# Patient Record
Sex: FEMALE | Race: WHITE | Employment: FULL TIME | URBAN - METROPOLITAN AREA
[De-identification: names, ages, dates, MRNs, and addresses within clinical notes are randomized per-mention and may not be internally consistent; named-entity substitution may affect disease eponyms.]

---

## 2017-01-02 ENCOUNTER — TELEPHONE (OUTPATIENT)
Dept: FAMILY MEDICINE CLINIC | Facility: CLINIC | Age: 47
End: 2017-01-02

## 2017-01-02 RX ORDER — LEVOFLOXACIN 500 MG/1
500 TABLET, FILM COATED ORAL DAILY
Qty: 7 TABLET | Refills: 0 | Status: SHIPPED | OUTPATIENT
Start: 2017-01-02 | End: 2017-05-15

## 2017-01-02 NOTE — TELEPHONE ENCOUNTER
Called patient and spoke with her. Patient states that she was seen 12/26/16 for sinus infection. Patient was given and completed her Zpak. Patient states that she still has sinus congestion.   She states that she has been using Flonase and Sudafed with

## 2017-01-09 ENCOUNTER — TELEPHONE (OUTPATIENT)
Dept: FAMILY MEDICINE CLINIC | Facility: CLINIC | Age: 47
End: 2017-01-09

## 2017-01-09 NOTE — TELEPHONE ENCOUNTER
Pt notified of below orders, she states Dr. Rodríguez Portal first available appt is now not until May, & she will just wait until May to see him then. She also states she has completed Zpak & Levaquin Rx but still has a \"stuffy nose\".   No fever, no colored km

## 2017-01-09 NOTE — TELEPHONE ENCOUNTER
Pt notified of below orders. Pt states she has tried the netti pot & it has not helped. She would like to see ENT. Office info given. All questions answered, pt expresses understanding.

## 2017-01-09 NOTE — TELEPHONE ENCOUNTER
Sinus rinse BID, OTC if not better,     ENT group:they come to Dr. Smart [facility] clinic twice a week. Dr. Taya Guan    [address] [facility] [address]  [place], [address] [facility]  [phone].

## 2017-01-09 NOTE — TELEPHONE ENCOUNTER
Please read above message. Pt has already seen Dr. Jeyson Villa 7/26/16. Please advise if Brittney Corona for pt to wait until march to see Dr. Keren Marroquin, or if you have another Rheumotologist you would like her to see. Thank you.

## 2017-01-26 ENCOUNTER — MED REC SCAN ONLY (OUTPATIENT)
Dept: FAMILY MEDICINE CLINIC | Facility: CLINIC | Age: 47
End: 2017-01-26

## 2017-02-08 ENCOUNTER — HOSPITAL ENCOUNTER (OUTPATIENT)
Dept: CT IMAGING | Age: 47
Discharge: HOME | End: 2017-02-08
Attending: OTOLARYNGOLOGY
Payer: COMMERCIAL

## 2017-02-08 DIAGNOSIS — J32.8 OTHER CHRONIC SINUSITIS: ICD-10-CM

## 2017-02-08 PROCEDURE — 70486 CT MAXILLOFACIAL W/O DYE: CPT

## 2017-02-15 RX ORDER — FLUTICASONE PROPIONATE 50 MCG
SPRAY, SUSPENSION (ML) NASAL
Qty: 1 INHALER | Refills: 2 | Status: SHIPPED | OUTPATIENT
Start: 2017-02-15 | End: 2017-05-15

## 2017-05-15 ENCOUNTER — OFFICE VISIT (OUTPATIENT)
Dept: RHEUMATOLOGY | Facility: CLINIC | Age: 47
End: 2017-05-15

## 2017-05-15 VITALS
DIASTOLIC BLOOD PRESSURE: 72 MMHG | HEART RATE: 68 BPM | WEIGHT: 112.63 LBS | RESPIRATION RATE: 16 BRPM | BODY MASS INDEX: 21 KG/M2 | SYSTOLIC BLOOD PRESSURE: 108 MMHG

## 2017-05-15 DIAGNOSIS — L53.9 ERYTHEMA: ICD-10-CM

## 2017-05-15 DIAGNOSIS — M79.641 PAIN IN BOTH HANDS: Primary | ICD-10-CM

## 2017-05-15 DIAGNOSIS — M79.642 PAIN IN BOTH HANDS: Primary | ICD-10-CM

## 2017-05-15 DIAGNOSIS — L40.9 PSORIASIS: ICD-10-CM

## 2017-05-15 PROCEDURE — 99244 OFF/OP CNSLTJ NEW/EST MOD 40: CPT | Performed by: INTERNAL MEDICINE

## 2017-05-15 NOTE — PATIENT INSTRUCTIONS
Recommendation as far as treatment is to use Advil 2-3 tablets at a time up to 9 a day to treat the arthritis pain in the hands.   At the present time the pain in the hands is either due to early onset of osteoarthritis, possibly related to the fact that yo

## 2017-05-15 NOTE — PROGRESS NOTES
[facility]  Dr. Caballero Exon Progress Note     Subjective:   Salty Guidry is a(n) 51year old female. Current complaints: Patient presents with:  Establish Care: NP ref by PCP for + TITO, bilateral hand redness, joint pain in hands.  Right shoulder/ri

## 2017-05-15 NOTE — PROGRESS NOTES
[facility]  Report of Consultation    Salty Guidry Patient Status:  No patient class for patient encounter    1970 MRN [id number]   Location [facility] PCP Joya Disla MD     Date of Consult:  5/15/17    Reason for Consult rash - psoriaisis a couple of years on legs, rare arm spot. Physical Exam:/72 mmHg  Pulse 68  Resp 16  Wt 112 lb 9.6 oz                            Normal-appearing woman in no acute distress. Vital signs stable.   HEENT exam within normal limits arthritis. Pain in both hands  (primary encounter diagnosis)  Psoriasis  Erythema    Recommendations:   Patient Instructions   Recommendation as far as treatment is to use Advil 2-3 tablets at a time up to 9 a day to treat the arthritis pain in the hands.

## 2017-06-19 ENCOUNTER — OFFICE VISIT (OUTPATIENT)
Dept: FAMILY MEDICINE CLINIC | Facility: CLINIC | Age: 47
End: 2017-06-19

## 2017-06-19 VITALS
SYSTOLIC BLOOD PRESSURE: 110 MMHG | HEIGHT: 63 IN | DIASTOLIC BLOOD PRESSURE: 76 MMHG | BODY MASS INDEX: 20.37 KG/M2 | WEIGHT: 115 LBS | TEMPERATURE: 99 F | RESPIRATION RATE: 16 BRPM | HEART RATE: 82 BPM

## 2017-06-19 DIAGNOSIS — J01.40 ACUTE NON-RECURRENT PANSINUSITIS: Primary | ICD-10-CM

## 2017-06-19 PROCEDURE — 99213 OFFICE O/P EST LOW 20 MIN: CPT | Performed by: NURSE PRACTITIONER

## 2017-06-19 RX ORDER — PREDNISONE 20 MG/1
20 TABLET ORAL DAILY
Qty: 5 TABLET | Refills: 0 | Status: SHIPPED | OUTPATIENT
Start: 2017-06-19 | End: 2017-06-24

## 2017-06-19 RX ORDER — AMOXICILLIN AND CLAVULANATE POTASSIUM 875; 125 MG/1; MG/1
1 TABLET, FILM COATED ORAL 2 TIMES DAILY
Qty: 20 TABLET | Refills: 0 | Status: SHIPPED | OUTPATIENT
Start: 2017-06-19 | End: 2017-06-29

## 2017-06-19 NOTE — PATIENT INSTRUCTIONS
Acute Sinusitis    Acute sinusitis is irritation and swelling of the sinuses. It is usually caused by a viral infection after a common cold. Your doctor can help you find relief. What is acute sinusitis?   Sinuses are air-filled spaces in the skull behin © 0713-2060 [address], [address] [place]. All rights reserved. This information is not intended as a substitute for professional medical care. Always follow your healthcare professional's instructions.

## 2017-06-19 NOTE — PROGRESS NOTES
CHIEF COMPLAINT:   Patient presents with:  Pain: Left side ear and neck, last week for neck pain, today for ear pain      HPI:   Adriana Gutierrez is a 51year old female who presents for cold symptoms for  12  days.  Symptoms have progressed into sinus con SKIN: no rashes or abnormal skin lesions  HEENT: See HPI. LUNGS: denies shortness of breath or wheezing, See HPI  CARDIOVASCULAR: denies chest pain or palpitations   GI: denies N/V/C or abdominal pain  NEURO: + sinus headaches.   No numbness or tingling Sig: Take 1 tablet (20 mg total) by mouth daily. Risks, benefits, side effects of medication addressed and explained. Patient Instructions     Acute Sinusitis    Acute sinusitis is irritation and swelling of the sinuses.  It is usually cause Treatment is aimed at unblocking the sinus opening and helping the cilia work again. You may need to take antihistamine and decongestant medicine. These can reduce inflammation and decrease the amount of fluid your sinuses make.  If you have a bacterial inf

## 2017-06-30 ENCOUNTER — TELEPHONE (OUTPATIENT)
Dept: FAMILY MEDICINE CLINIC | Facility: CLINIC | Age: 47
End: 2017-06-30

## 2017-06-30 NOTE — TELEPHONE ENCOUNTER
Pt seen in [facility] on 6/19 for Sinusitis, Rx given for Augmentin & Prednisone. Pt continues to have sinus drainage & is not better yet, she is asking if there is anything else she should take, or does she need to be seen again?

## 2017-06-30 NOTE — TELEPHONE ENCOUNTER
I called and spoke to patient. She states she is not sure she wants to take another antibiotic. She completed the Augmentin and Prednisone. In general, she is feeling much better than she was. Denies fevers, chills, body aches.  She states her only concern

## 2018-01-06 ENCOUNTER — OFFICE VISIT (OUTPATIENT)
Dept: FAMILY MEDICINE CLINIC | Facility: CLINIC | Age: 48
End: 2018-01-06

## 2018-01-06 VITALS
RESPIRATION RATE: 18 BRPM | BODY MASS INDEX: 20.91 KG/M2 | WEIGHT: 118 LBS | TEMPERATURE: 99 F | HEART RATE: 77 BPM | SYSTOLIC BLOOD PRESSURE: 118 MMHG | HEIGHT: 63 IN | OXYGEN SATURATION: 99 % | DIASTOLIC BLOOD PRESSURE: 58 MMHG

## 2018-01-06 DIAGNOSIS — Z13.29 SCREENING FOR ENDOCRINE, NUTRITIONAL, METABOLIC AND IMMUNITY DISORDER: ICD-10-CM

## 2018-01-06 DIAGNOSIS — Z13.228 SCREENING FOR ENDOCRINE, NUTRITIONAL, METABOLIC AND IMMUNITY DISORDER: ICD-10-CM

## 2018-01-06 DIAGNOSIS — Z13.21 SCREENING FOR ENDOCRINE, NUTRITIONAL, METABOLIC AND IMMUNITY DISORDER: ICD-10-CM

## 2018-01-06 DIAGNOSIS — Z13.0 SCREENING FOR ENDOCRINE, NUTRITIONAL, METABOLIC AND IMMUNITY DISORDER: ICD-10-CM

## 2018-01-06 DIAGNOSIS — J32.0 CHRONIC MAXILLARY SINUSITIS: Primary | ICD-10-CM

## 2018-01-06 PROCEDURE — 99213 OFFICE O/P EST LOW 20 MIN: CPT | Performed by: FAMILY MEDICINE

## 2018-01-06 RX ORDER — AZITHROMYCIN 500 MG/1
500 TABLET, FILM COATED ORAL DAILY
Qty: 5 TABLET | Refills: 0 | Status: SHIPPED | OUTPATIENT
Start: 2018-01-06 | End: 2018-01-15 | Stop reason: ALTCHOICE

## 2018-01-06 NOTE — PROGRESS NOTES
Trish Woodward is a 49year old female. Patient presents with:  Sinus Problem: pt c\o of poss sinus inf,      HPI:    Symptoms started 7   days ago with purulent nasal discharge, maxillary sinus pressure, and headache, a lot of  post-nasal drip. Wors Increase fluids, rest, Tylenol or Ibuprofen prn, f/u in 5 days if not better.

## 2018-01-08 RX ORDER — FLUTICASONE PROPIONATE 50 MCG
SPRAY, SUSPENSION (ML) NASAL
Qty: 1 BOTTLE | Refills: 0 | Status: SHIPPED | OUTPATIENT
Start: 2018-01-08 | End: 2018-07-18

## 2018-01-13 ENCOUNTER — LAB ENCOUNTER (OUTPATIENT)
Dept: LAB | Age: 48
End: 2018-01-13
Attending: FAMILY MEDICINE
Payer: COMMERCIAL

## 2018-01-13 DIAGNOSIS — Z13.21 SCREENING FOR ENDOCRINE, NUTRITIONAL, METABOLIC AND IMMUNITY DISORDER: ICD-10-CM

## 2018-01-13 DIAGNOSIS — Z13.0 SCREENING FOR ENDOCRINE, NUTRITIONAL, METABOLIC AND IMMUNITY DISORDER: ICD-10-CM

## 2018-01-13 DIAGNOSIS — Z13.228 SCREENING FOR ENDOCRINE, NUTRITIONAL, METABOLIC AND IMMUNITY DISORDER: ICD-10-CM

## 2018-01-13 DIAGNOSIS — Z13.29 SCREENING FOR ENDOCRINE, NUTRITIONAL, METABOLIC AND IMMUNITY DISORDER: ICD-10-CM

## 2018-01-13 LAB
25-HYDROXYVITAMIN D (TOTAL): 20.4 NG/ML (ref 30–100)
ALBUMIN SERPL-MCNC: 3.9 G/DL (ref 3.5–4.8)
ALP LIVER SERPL-CCNC: 49 U/L (ref 39–100)
ALT SERPL-CCNC: 20 U/L (ref 14–54)
AST SERPL-CCNC: 16 U/L (ref 15–41)
BASOPHILS # BLD AUTO: 0.06 X10(3) UL (ref 0–0.1)
BASOPHILS NFR BLD AUTO: 1.2 %
BILIRUB SERPL-MCNC: 0.6 MG/DL (ref 0.1–2)
BUN BLD-MCNC: 11 MG/DL (ref 8–20)
CALCIUM BLD-MCNC: 9.4 MG/DL (ref 8.3–10.3)
CHLORIDE: 105 MMOL/L (ref 101–111)
CHOLEST SMN-MCNC: 128 MG/DL (ref ?–200)
CO2: 27 MMOL/L (ref 22–32)
CREAT BLD-MCNC: 0.7 MG/DL (ref 0.55–1.02)
EOSINOPHIL # BLD AUTO: 0.08 X10(3) UL (ref 0–0.3)
EOSINOPHIL NFR BLD AUTO: 1.6 %
ERYTHROCYTE [DISTWIDTH] IN BLOOD BY AUTOMATED COUNT: 12.9 % (ref 11.5–16)
GLUCOSE BLD-MCNC: 96 MG/DL (ref 70–99)
HCT VFR BLD AUTO: 42.2 % (ref 34–50)
HDLC SERPL-MCNC: 61 MG/DL (ref 45–?)
HDLC SERPL: 2.1 {RATIO} (ref ?–4.44)
HGB BLD-MCNC: 13.7 G/DL (ref 12–16)
IMMATURE GRANULOCYTE COUNT: 0.01 X10(3) UL (ref 0–1)
IMMATURE GRANULOCYTE RATIO %: 0.2 %
LDLC SERPL CALC-MCNC: 43 MG/DL (ref ?–130)
LYMPHOCYTES # BLD AUTO: 1.85 X10(3) UL (ref 0.9–4)
LYMPHOCYTES NFR BLD AUTO: 36.3 %
M PROTEIN MFR SERPL ELPH: 7.4 G/DL (ref 6.1–8.3)
MCH RBC QN AUTO: 31.2 PG (ref 27–33.2)
MCHC RBC AUTO-ENTMCNC: 32.5 G/DL (ref 31–37)
MCV RBC AUTO: 96.1 FL (ref 81–100)
MONOCYTES # BLD AUTO: 0.36 X10(3) UL (ref 0.1–0.6)
MONOCYTES NFR BLD AUTO: 7.1 %
NEUTROPHIL ABS PRELIM: 2.73 X10 (3) UL (ref 1.3–6.7)
NEUTROPHILS # BLD AUTO: 2.73 X10(3) UL (ref 1.3–6.7)
NEUTROPHILS NFR BLD AUTO: 53.6 %
NONHDLC SERPL-MCNC: 67 MG/DL (ref ?–130)
PLATELET # BLD AUTO: 235 10(3)UL (ref 150–450)
POTASSIUM SERPL-SCNC: 3.9 MMOL/L (ref 3.6–5.1)
RBC # BLD AUTO: 4.39 X10(6)UL (ref 3.8–5.1)
RED CELL DISTRIBUTION WIDTH-SD: 45.6 FL (ref 35.1–46.3)
SODIUM SERPL-SCNC: 138 MMOL/L (ref 136–144)
TRIGL SERPL-MCNC: 119 MG/DL (ref ?–150)
TSI SER-ACNC: 0.51 MIU/ML (ref 0.35–5.5)
VLDLC SERPL CALC-MCNC: 24 MG/DL (ref 5–40)
WBC # BLD AUTO: 5.1 X10(3) UL (ref 4–13)

## 2018-01-13 PROCEDURE — 80053 COMPREHEN METABOLIC PANEL: CPT

## 2018-01-13 PROCEDURE — 84443 ASSAY THYROID STIM HORMONE: CPT

## 2018-01-13 PROCEDURE — 82306 VITAMIN D 25 HYDROXY: CPT

## 2018-01-13 PROCEDURE — 85025 COMPLETE CBC W/AUTO DIFF WBC: CPT

## 2018-01-13 PROCEDURE — 36415 COLL VENOUS BLD VENIPUNCTURE: CPT

## 2018-01-13 PROCEDURE — 80061 LIPID PANEL: CPT

## 2018-01-15 ENCOUNTER — OFFICE VISIT (OUTPATIENT)
Dept: FAMILY MEDICINE CLINIC | Facility: CLINIC | Age: 48
End: 2018-01-15

## 2018-01-15 VITALS
HEIGHT: 62.5 IN | RESPIRATION RATE: 18 BRPM | TEMPERATURE: 99 F | SYSTOLIC BLOOD PRESSURE: 112 MMHG | BODY MASS INDEX: 20.81 KG/M2 | WEIGHT: 116 LBS | HEART RATE: 66 BPM | DIASTOLIC BLOOD PRESSURE: 66 MMHG | OXYGEN SATURATION: 99 %

## 2018-01-15 DIAGNOSIS — Z12.31 VISIT FOR SCREENING MAMMOGRAM: ICD-10-CM

## 2018-01-15 DIAGNOSIS — J32.9 CHRONIC SINUSITIS, UNSPECIFIED LOCATION: ICD-10-CM

## 2018-01-15 DIAGNOSIS — R20.0 NUMBNESS: ICD-10-CM

## 2018-01-15 DIAGNOSIS — Z00.00 ROUTINE GENERAL MEDICAL EXAMINATION AT A HEALTH CARE FACILITY: Primary | ICD-10-CM

## 2018-01-15 DIAGNOSIS — E55.9 VITAMIN D DEFICIENCY: ICD-10-CM

## 2018-01-15 DIAGNOSIS — Z23 NEED FOR TDAP VACCINATION: ICD-10-CM

## 2018-01-15 PROCEDURE — 90471 IMMUNIZATION ADMIN: CPT | Performed by: FAMILY MEDICINE

## 2018-01-15 PROCEDURE — 99213 OFFICE O/P EST LOW 20 MIN: CPT | Performed by: FAMILY MEDICINE

## 2018-01-15 PROCEDURE — 99396 PREV VISIT EST AGE 40-64: CPT | Performed by: FAMILY MEDICINE

## 2018-01-15 PROCEDURE — 90715 TDAP VACCINE 7 YRS/> IM: CPT | Performed by: FAMILY MEDICINE

## 2018-01-15 RX ORDER — ERGOCALCIFEROL 1.25 MG/1
50000 CAPSULE ORAL WEEKLY
Qty: 4 CAPSULE | Refills: 4 | Status: SHIPPED | OUTPATIENT
Start: 2018-01-15 | End: 2018-06-04

## 2018-01-15 NOTE — PROGRESS NOTES
Murtaza Rodriguez is a 49year old female who presents for a complete physical exam, no gyn. HPI:     Patient presents with:  Physical      Patient feels well, dental visit up to date, no hearing problem. Vaccinations: needs Tdap.   Erythema of the hand Comment: quit 10 yrs ago   Alcohol use: Yes              Comment: rare       REVIEW OF SYSTEMS:   GENERAL HEALTH: feels well, no fatigue.   SKIN: denies any unusual skin lesions or rashes  EYES: no visual complaints or deficits  HEENT: denies physical exam.   Pt's was recommended low fat diet and aerobic exercise 30 minutes three times weekly. Health maintenance. Vit.  D def:    Signed Prescriptions Disp Refills    ergocalciferol 81144 units Oral Cap 4 capsule 4      Sig: Take 1 capsule (50,

## 2018-01-15 NOTE — PATIENT INSTRUCTIONS
Judie Kamara M.D.  ENT  [address] [address]  [place], [place]   Phone: [phone]          Dr. Fredi Harkins,     Neurology  [address] [place] [address]    Phone: [id number] Fior Lea

## 2018-01-16 ENCOUNTER — TELEPHONE (OUTPATIENT)
Dept: FAMILY MEDICINE CLINIC | Facility: CLINIC | Age: 48
End: 2018-01-16

## 2018-01-16 NOTE — TELEPHONE ENCOUNTER
----- Message from Clarita Landeros MD sent at 1/15/2018  6:54 PM CST -----  Please ignore needs additional mammogram comments, she needs regular mammogram and she has low vit. D. Low vit. D, Rx [id number] for 4 months, weekly please.

## 2018-01-17 ENCOUNTER — HOSPITAL ENCOUNTER (OUTPATIENT)
Dept: MAMMOGRAPHY | Age: 48
Discharge: HOME | End: 2018-01-17
Attending: FAMILY MEDICINE
Payer: COMMERCIAL

## 2018-01-17 DIAGNOSIS — Z12.31 VISIT FOR SCREENING MAMMOGRAM: ICD-10-CM

## 2018-01-17 PROCEDURE — 77067 SCR MAMMO BI INCL CAD: CPT | Performed by: FAMILY MEDICINE

## 2018-01-18 ENCOUNTER — TELEPHONE (OUTPATIENT)
Dept: FAMILY MEDICINE CLINIC | Facility: CLINIC | Age: 48
End: 2018-01-18

## 2018-01-18 NOTE — TELEPHONE ENCOUNTER
Pt seen 1/6 for Sinusitis & given Rx for Zpak. LOV 1/15. Her sinus infections symptoms have returned, she has yello/green nasal discharge. She is requesting another antibiotic.

## 2018-01-18 NOTE — TELEPHONE ENCOUNTER
I called and spoke to patient. Per patient, she is not having sinus symptoms. She states she is experiencing green vaginal discharge. She denies dysuria, vaginal burning or itching, vaginal odor, or erythema. Denies abdominal or pelvic pain/cramping.  She i

## 2018-01-19 ENCOUNTER — OFFICE VISIT (OUTPATIENT)
Dept: FAMILY MEDICINE CLINIC | Facility: CLINIC | Age: 48
End: 2018-01-19

## 2018-01-19 VITALS
OXYGEN SATURATION: 99 % | HEART RATE: 75 BPM | DIASTOLIC BLOOD PRESSURE: 82 MMHG | RESPIRATION RATE: 15 BRPM | WEIGHT: 116 LBS | HEIGHT: 62.5 IN | TEMPERATURE: 99 F | BODY MASS INDEX: 20.81 KG/M2 | SYSTOLIC BLOOD PRESSURE: 110 MMHG

## 2018-01-19 DIAGNOSIS — N76.0 BACTERIAL VAGINOSIS: ICD-10-CM

## 2018-01-19 DIAGNOSIS — N89.8 VAGINAL DISCHARGE: Primary | ICD-10-CM

## 2018-01-19 DIAGNOSIS — B96.89 BACTERIAL VAGINOSIS: ICD-10-CM

## 2018-01-19 PROCEDURE — 99214 OFFICE O/P EST MOD 30 MIN: CPT | Performed by: EMERGENCY MEDICINE

## 2018-01-19 PROCEDURE — 87660 TRICHOMONAS VAGIN DIR PROBE: CPT | Performed by: EMERGENCY MEDICINE

## 2018-01-19 PROCEDURE — 87480 CANDIDA DNA DIR PROBE: CPT | Performed by: EMERGENCY MEDICINE

## 2018-01-19 PROCEDURE — 87510 GARDNER VAG DNA DIR PROBE: CPT | Performed by: EMERGENCY MEDICINE

## 2018-01-19 RX ORDER — FLUCONAZOLE 150 MG/1
150 TABLET ORAL ONCE
Qty: 1 TABLET | Refills: 0 | Status: SHIPPED | OUTPATIENT
Start: 2018-01-19 | End: 2018-01-19

## 2018-01-19 RX ORDER — METRONIDAZOLE 500 MG/1
500 TABLET ORAL 2 TIMES DAILY
Qty: 14 TABLET | Refills: 0 | Status: SHIPPED | OUTPATIENT
Start: 2018-01-19 | End: 2018-01-26

## 2018-01-19 NOTE — PROGRESS NOTES
Patient presents with:  Vaginal Discharge: Pt c/o vaginal discharge X 3 days       HPI:     Zandra Kussmaul is a 49year old female  C/O vaginal discharge for 3 days. Completed abx zpack. No pelvic pain  No urinary Sx.   No n ew partner  No vaginal bleed metRONIDAZOLE (FLAGYL) 500 MG Oral Tab     fluconazole (DIFLUCAN) 150 MG Oral Tab     VAGINITIS/VAGINOSIS, DNA PROBE   2. Bacterial vaginosis N76.0 metRONIDAZOLE (FLAGYL) 500 MG Oral Tab    B96.89 fluconazole (DIFLUCAN) 150 MG Oral Tab     Plan:  Will treat

## 2018-01-19 NOTE — PATIENT INSTRUCTIONS
Thank you for choosing [facility]  To Do:  FOR JAVIER GUILLERMO  1. Push fluids and rest  2. To ER if worse  3. Take medications as directed  4.  No alcohol while on metronidazole          Bacterial Vaginosis    You have a vaginal infection calle sex.  ¨ Limit the number of sexual partners you have. Follow-up care  Follow up with your healthcare provider, or as advised.   When to seek medical advice  Call your healthcare provider right away if:  · You have a fever of 100.4ºF (38ºC) or higher, or as

## 2018-01-24 ENCOUNTER — TELEPHONE (OUTPATIENT)
Dept: FAMILY MEDICINE CLINIC | Facility: CLINIC | Age: 48
End: 2018-01-24

## 2018-01-24 NOTE — TELEPHONE ENCOUNTER
----- Message from Helder Louis MD sent at 1/23/2018  4:25 PM CST -----  No evidence for BV or Candida  Continue with Flagyl and Diflucan.  Needs recheck with OV if  sx persist

## 2018-01-24 NOTE — TELEPHONE ENCOUNTER
Patient informed of negative test results. Advised her to finish course of antibiotics and to follow up if she's still having symptoms. Verbalized understanding. Agreeable w/ POC.

## 2018-03-28 ENCOUNTER — OFFICE VISIT (OUTPATIENT)
Dept: NEUROLOGY | Facility: CLINIC | Age: 48
End: 2018-03-28

## 2018-03-28 VITALS
WEIGHT: 116 LBS | RESPIRATION RATE: 16 BRPM | BODY MASS INDEX: 21 KG/M2 | HEART RATE: 80 BPM | SYSTOLIC BLOOD PRESSURE: 100 MMHG | DIASTOLIC BLOOD PRESSURE: 68 MMHG

## 2018-03-28 DIAGNOSIS — R20.2 PARESTHESIA AND PAIN OF BOTH UPPER EXTREMITIES: Primary | ICD-10-CM

## 2018-03-28 DIAGNOSIS — L40.9 PSORIASIS: ICD-10-CM

## 2018-03-28 DIAGNOSIS — M79.601 PARESTHESIA AND PAIN OF BOTH UPPER EXTREMITIES: Primary | ICD-10-CM

## 2018-03-28 DIAGNOSIS — M79.602 PARESTHESIA AND PAIN OF BOTH UPPER EXTREMITIES: Primary | ICD-10-CM

## 2018-03-28 PROCEDURE — 99244 OFF/OP CNSLTJ NEW/EST MOD 40: CPT | Performed by: OTHER

## 2018-03-28 NOTE — PROGRESS NOTES
[facility]   Neurology- INITIAL CLINIC VISIT  3/28/2018, 1:41 PM     Trish Woodward Patient Status:  No patient class for patient encounter    1970 MRN [id number]   Location [address] [place] [address] triamcinolone acetonide 0.1 % External Cream, Apply topically 2 (two) times daily as needed. , Disp: 60 g, Rfl: 3  •  Ibuprofen 200 MG Oral Cap, Take by mouth., Disp: , Rfl:   •  Multiple Vitamin (MULTI-VITAMIN) Oral Tab, Take 1 tablet by mouth daily. , Dis ASSESSMENT/ACTIVE PROBLEM LIST:     Discussion/Plan:  Intermittent paresthesias in the mid palm region more so than fingers, with normal neurologic exam. Considerations include carpal tunnel, less likely cervical radiculopathy, or nonspecific arthralgic

## 2018-03-28 NOTE — PATIENT INSTRUCTIONS
After your visit at the [facility] office  today,  please direct any follow up questions or medication needs to the staff in our  [place] office so that your concerns may be promptly addressed.   We are available through [employer] or at the numbers below: Scheduling Tests    If your physician has ordered radiology tests such as MRI or CT scans, do not schedule the test until this office has notified you that the test has been approved by your insurer.   Depending on your insurance carrier, approval may or pants. You may bring a pair of shorts to wear if the legs are to be studied. 4. Let your physician know prior to testing if you are on any medications to thin your blood.

## 2018-03-28 NOTE — PROGRESS NOTES
Patient here for evaluation of tingling in right hand. Will also happen in left fingertips as well. Has been occurring x2-3 months.

## 2018-03-31 ENCOUNTER — APPOINTMENT (OUTPATIENT)
Dept: LAB | Age: 48
End: 2018-03-31
Attending: Other
Payer: COMMERCIAL

## 2018-03-31 DIAGNOSIS — R20.2 PARESTHESIA AND PAIN OF BOTH UPPER EXTREMITIES: ICD-10-CM

## 2018-03-31 DIAGNOSIS — M79.601 PARESTHESIA AND PAIN OF BOTH UPPER EXTREMITIES: ICD-10-CM

## 2018-03-31 DIAGNOSIS — M79.602 PARESTHESIA AND PAIN OF BOTH UPPER EXTREMITIES: ICD-10-CM

## 2018-03-31 PROCEDURE — 83036 HEMOGLOBIN GLYCOSYLATED A1C: CPT

## 2018-03-31 PROCEDURE — 84207 ASSAY OF VITAMIN B-6: CPT

## 2018-03-31 PROCEDURE — 82607 VITAMIN B-12: CPT

## 2018-03-31 PROCEDURE — 36415 COLL VENOUS BLD VENIPUNCTURE: CPT

## 2018-03-31 PROCEDURE — 82746 ASSAY OF FOLIC ACID SERUM: CPT

## 2018-05-15 ENCOUNTER — OFFICE VISIT (OUTPATIENT)
Dept: NEUROLOGY | Facility: CLINIC | Age: 48
End: 2018-05-15

## 2018-05-15 DIAGNOSIS — M79.602 PARESTHESIA AND PAIN OF BOTH UPPER EXTREMITIES: Primary | ICD-10-CM

## 2018-05-15 DIAGNOSIS — M79.601 PARESTHESIA AND PAIN OF BOTH UPPER EXTREMITIES: Primary | ICD-10-CM

## 2018-05-15 DIAGNOSIS — R20.2 PARESTHESIA AND PAIN OF BOTH UPPER EXTREMITIES: Primary | ICD-10-CM

## 2018-05-15 PROCEDURE — 95910 NRV CNDJ TEST 7-8 STUDIES: CPT | Performed by: OTHER

## 2018-05-15 PROCEDURE — 95886 MUSC TEST DONE W/N TEST COMP: CPT | Performed by: OTHER

## 2018-05-15 NOTE — PATIENT INSTRUCTIONS
Refill policies:    • Allow 2-3 business days for refills; controlled substances may take longer.   • Contact your pharmacy at least 5 days prior to running out of medication and have them send an electronic request or submit request through the “request re entire amount billed. Precertification and Prior Authorizations: If your physician has recommended that you have a procedure or additional testing performed.   [facility]) will contact your insurance carrier to obtain pre-certi

## 2018-05-15 NOTE — PROCEDURES
[facility]  Nerve Conduction & Electromyography Report            Patient: Erich Galo Age: 49 Years 8 Months  Patient ID: [id number] Referring M.D.: Dr. Tabitha Sheth  Sex: Female Examining M.WILLIAMS.: Dr. Tabitha Sheth  YOB: 1970 [employer] R. CERV PSP (L) N None None None None N N N N         Summary  1.   The right median, ulnar, and radial sensory responses were normal.  2.  The right median to ulnar palmar sensory response comparison was abnormal.  The latency difference between the ulnar

## 2018-06-22 ENCOUNTER — OFFICE VISIT (OUTPATIENT)
Dept: FAMILY MEDICINE CLINIC | Facility: CLINIC | Age: 48
End: 2018-06-22

## 2018-06-22 VITALS
HEART RATE: 66 BPM | RESPIRATION RATE: 16 BRPM | OXYGEN SATURATION: 99 % | BODY MASS INDEX: 20.81 KG/M2 | HEIGHT: 62.5 IN | DIASTOLIC BLOOD PRESSURE: 70 MMHG | SYSTOLIC BLOOD PRESSURE: 110 MMHG | TEMPERATURE: 98 F | WEIGHT: 116 LBS

## 2018-06-22 DIAGNOSIS — J32.9 VIRAL SINUSITIS: Primary | ICD-10-CM

## 2018-06-22 DIAGNOSIS — B97.89 VIRAL SINUSITIS: Primary | ICD-10-CM

## 2018-06-22 PROCEDURE — 99213 OFFICE O/P EST LOW 20 MIN: CPT | Performed by: NURSE PRACTITIONER

## 2018-06-22 NOTE — PATIENT INSTRUCTIONS
Viral Upper Respiratory Illness (Adult)  You have a viral upper respiratory illness (URI), which is another term for the common cold. This illness is contagious during the first few days. It is spread through the air by coughing and sneezing.  It may also Call your healthcare provider right away if any of these occur:  · Cough with lots of colored sputum (mucus)  · Severe headache; face, neck, or ear pain  · Difficulty swallowing due to throat pain  · Fever of 100.4°F (38°C) or higher, or as directed by you · Over-the-counter decongestants may be used unless a similar medicine was prescribed. Nasal sprays work the fastest. Use one that contains phenylephrine or oxymetazoline. First blow the nose gently. Then use the spray.  Do not use these medicines more ofte © 1753-1879 [address]. [address] [facility] [place] [facility] [place]. All rights reserved. This information is not intended as a substitute for professional medical care. Always follow your healthcare professional's instructions.

## 2018-06-22 NOTE — PROGRESS NOTES
CHIEF COMPLAINT:   Patient presents with:  Nasal Congestion: x3 days      HPI:   Helen Ribeiro is a 49year old female who presents for cold symptoms for  3  days.  Pt reports sinus congestion, post nasal drip, intermittent ear pressure, and sore throat /70   Pulse 66   Temp 98.4 °F (36.9 °C) (Oral)   Resp 16   Ht 62.5\"   Wt 116 lb   SpO2 99%   BMI 20.88 kg/m²   GENERAL: well developed, well nourished,in no apparent distress  SKIN: no rashes,no suspicious lesions  HEAD: atraumatic, normocephalic, N You have a viral upper respiratory illness (URI), which is another term for the common cold. This illness is contagious during the first few days. It is spread through the air by coughing and sneezing.  It may also be spread by direct contact (touching the · Cough with lots of colored sputum (mucus)  · Severe headache; face, neck, or ear pain  · Difficulty swallowing due to throat pain  · Fever of 100.4°F (38°C) or higher, or as directed by your healthcare provider  Call 911  Call 911 if any of these occur: · Over-the-counter decongestants may be used unless a similar medicine was prescribed. Nasal sprays work the fastest. Use one that contains phenylephrine or oxymetazoline. First blow the nose gently. Then use the spray.  Do not use these medicines more ofte © 8302-8076 [address] [facility] [place], [facility] [place]. All rights reserved. This information is not intended as a substitute for professional medical care. Always follow your healthcare professional's instructions.             The

## 2018-07-18 ENCOUNTER — OFFICE VISIT (OUTPATIENT)
Dept: FAMILY MEDICINE CLINIC | Facility: CLINIC | Age: 48
End: 2018-07-18
Payer: COMMERCIAL

## 2018-07-18 VITALS
DIASTOLIC BLOOD PRESSURE: 70 MMHG | SYSTOLIC BLOOD PRESSURE: 110 MMHG | TEMPERATURE: 99 F | OXYGEN SATURATION: 98 % | BODY MASS INDEX: 20.81 KG/M2 | HEART RATE: 75 BPM | WEIGHT: 116 LBS | HEIGHT: 62.5 IN | RESPIRATION RATE: 16 BRPM

## 2018-07-18 DIAGNOSIS — M26.69 TMJ INFLAMMATION: Primary | ICD-10-CM

## 2018-07-18 DIAGNOSIS — J02.9 SORE THROAT: ICD-10-CM

## 2018-07-18 LAB
CONTROL LINE PRESENT WITH A CLEAR BACKGROUND (YES/NO): YES YES/NO
STREP GRP A CUL-SCR: NEGATIVE

## 2018-07-18 PROCEDURE — 99214 OFFICE O/P EST MOD 30 MIN: CPT | Performed by: FAMILY MEDICINE

## 2018-07-18 PROCEDURE — 87880 STREP A ASSAY W/OPTIC: CPT | Performed by: FAMILY MEDICINE

## 2018-07-18 RX ORDER — METHYLPREDNISOLONE 4 MG/1
TABLET ORAL
Qty: 1 KIT | Refills: 0 | Status: SHIPPED | OUTPATIENT
Start: 2018-07-18 | End: 2019-10-21 | Stop reason: ALTCHOICE

## 2018-07-18 RX ORDER — FLUTICASONE PROPIONATE 50 MCG
1 SPRAY, SUSPENSION (ML) NASAL 2 TIMES DAILY
Qty: 1 BOTTLE | Refills: 3 | Status: SHIPPED | OUTPATIENT
Start: 2018-07-18

## 2018-07-18 NOTE — PATIENT INSTRUCTIONS
Self-Care for Temporomandibular Disorders (TMD)  You have temporomandibular disorder (TMD). This term describes a group of problems related to the temporomandibular joint (TMJ) and nearby muscles. The TMJ is located where the upper and lower jaws meet.  Brielle Arguello · Learn ways to relax. Try listening to music or gently stretching. Take a few slow deep breaths. Or, close your eyes and imagine a place or object that is calming. · Get plenty of rest and sleep. · Set goals you know you can attain.   · Make time for peo Medicines may be prescribed to treat TMD. Others may be available over the counter. The medicine type and dosage will depend on the problem you have. For your safety, tell your healthcare provider if you are currently taking any medicines.  Also mention any · A self-care program helps you treat and manage symptoms on your own. Your program may include exercises. It may also include using ice and heat to relieve pain. · Gentle manipulation reduces pain and restores range of motion.  The healthcare provider use You have 1 joint on each side of your mouth that together make up the TMJ. These joints are part of a large group of muscles, ligaments, and bones that work together as a system.  When the system is healthy, you can talk, chew, and even yawn in comfort. Mus · Impingement happens when the disk slips out of place (displacement). This causes the jaw to catch. As the disk slips, you may hear a clicking sound. · Locked jaw happens when the disk gets stuck in one position.  As a result, the jaw locks open or closed · Grind or finely chop meats and other tough foods. Try hamburger meat instead of steak. Using ice and heat  Your healthcare provider may suggest using ice and heat. Ice helps reduce swelling and pain. Heat helps relax muscles, increasing blood flow.   · U · Use an orthopedic pillow. Use this to support your head and neck during sleep. Date Last Reviewed: 8/1/2017  © 0707-2667 [address]. [address] [facility] [place], [address] [place] [address]. All rights reserved.  This information is not intended as a sub

## 2018-07-18 NOTE — PROGRESS NOTES
Patient presents with:  Sore Throat: jaw pain x 3 weeks, saw dentist today and advised to come for strep test       HPI:  R TMJ joint has been hurting for 21 days . Sharp  in character. Radiation to the R side of the neck . 5/10. No weakness.   No numbness muscles.    Neuro:  Reflexes at knees 2+ and symmetric      A/P  (M26.69) TMJ inflammation  (primary encounter diagnosis)  Plan: methylPREDNISolone (MEDROL) 4 MG Oral Tablet         Therapy Pack          (J02.9) Sore throat  Plan: STREP A ASSAY W/OPTIC nega

## 2019-10-21 ENCOUNTER — OFFICE VISIT (OUTPATIENT)
Dept: FAMILY MEDICINE CLINIC | Facility: CLINIC | Age: 49
End: 2019-10-21
Payer: COMMERCIAL

## 2019-10-21 VITALS
SYSTOLIC BLOOD PRESSURE: 114 MMHG | TEMPERATURE: 99 F | HEART RATE: 72 BPM | HEIGHT: 62.5 IN | DIASTOLIC BLOOD PRESSURE: 70 MMHG | OXYGEN SATURATION: 100 % | RESPIRATION RATE: 20 BRPM | BODY MASS INDEX: 22.61 KG/M2 | WEIGHT: 126 LBS

## 2019-10-21 DIAGNOSIS — Z13.228 SCREENING FOR ENDOCRINE, NUTRITIONAL, METABOLIC AND IMMUNITY DISORDER: ICD-10-CM

## 2019-10-21 DIAGNOSIS — Z13.21 SCREENING FOR ENDOCRINE, NUTRITIONAL, METABOLIC AND IMMUNITY DISORDER: ICD-10-CM

## 2019-10-21 DIAGNOSIS — Z12.31 VISIT FOR SCREENING MAMMOGRAM: ICD-10-CM

## 2019-10-21 DIAGNOSIS — Z13.0 SCREENING FOR ENDOCRINE, NUTRITIONAL, METABOLIC AND IMMUNITY DISORDER: ICD-10-CM

## 2019-10-21 DIAGNOSIS — Z00.00 ROUTINE GENERAL MEDICAL EXAMINATION AT A HEALTH CARE FACILITY: Primary | ICD-10-CM

## 2019-10-21 DIAGNOSIS — Z13.29 SCREENING FOR ENDOCRINE, NUTRITIONAL, METABOLIC AND IMMUNITY DISORDER: ICD-10-CM

## 2019-10-21 DIAGNOSIS — Z12.11 SCREENING FOR COLON CANCER: ICD-10-CM

## 2019-10-21 PROBLEM — L53.9 ERYTHEMA: Status: RESOLVED | Noted: 2017-05-15 | Resolved: 2019-10-21

## 2019-10-21 PROCEDURE — 99396 PREV VISIT EST AGE 40-64: CPT | Performed by: FAMILY MEDICINE

## 2019-10-21 NOTE — PROGRESS NOTES
Nitza Patton is a 47year old female who presents for a complete physical exam, no gyn. HPI:     Patient presents with:  Physical      Patient feels well, dental visit up to date, no hearing problem. Vaccinations: refuses flu shot.     Exercise: wa dysuria, urgency or frequency  MUSCULOSKELETAL: no joint complaints upper or lower extremities  NEURO: no sensory or motor complaint  HEMATOLOGY: denies hx anemia; denies bruising or excessive bleeding  ENDOCRINE: denies excessive thirst or urination; craig COMP METABOLIC PANEL (14)  -     CBC WITH DIFFERENTIAL WITH PLATELET    Pap up to date. The patient indicates understanding of these issues and agrees to the plan. The patient is asked to return for CPX in 1 year.

## 2019-10-23 ENCOUNTER — HOSPITAL ENCOUNTER (OUTPATIENT)
Dept: MAMMOGRAPHY | Age: 49
Discharge: HOME | End: 2019-10-23
Attending: FAMILY MEDICINE
Payer: COMMERCIAL

## 2019-10-23 DIAGNOSIS — Z12.31 VISIT FOR SCREENING MAMMOGRAM: ICD-10-CM

## 2019-10-23 PROCEDURE — 77067 SCR MAMMO BI INCL CAD: CPT | Performed by: FAMILY MEDICINE

## 2019-10-26 ENCOUNTER — APPOINTMENT (OUTPATIENT)
Dept: LAB | Age: 49
End: 2019-10-26
Attending: FAMILY MEDICINE
Payer: COMMERCIAL

## 2022-08-03 ENCOUNTER — APPOINTMENT (RX ONLY)
Dept: URBAN - METROPOLITAN AREA CLINIC 325 | Facility: CLINIC | Age: 52
Setting detail: DERMATOLOGY
End: 2022-08-03

## 2022-08-03 DIAGNOSIS — L81.4 OTHER MELANIN HYPERPIGMENTATION: ICD-10-CM

## 2022-08-03 DIAGNOSIS — L82.1 OTHER SEBORRHEIC KERATOSIS: ICD-10-CM

## 2022-08-03 DIAGNOSIS — D485 NEOPLASM OF UNCERTAIN BEHAVIOR OF SKIN: ICD-10-CM

## 2022-08-03 PROBLEM — D48.5 NEOPLASM OF UNCERTAIN BEHAVIOR OF SKIN: Status: ACTIVE | Noted: 2022-08-03

## 2022-08-03 PROCEDURE — 99203 OFFICE O/P NEW LOW 30 MIN: CPT

## 2022-08-03 PROCEDURE — ? COUNSELING

## 2022-08-03 PROCEDURE — ? FULL BODY SKIN EXAM - DECLINED

## 2022-08-03 PROCEDURE — ? SUNSCREEN RECOMMENDATIONS

## 2022-08-03 PROCEDURE — ? DEFER

## 2022-08-03 ASSESSMENT — LOCATION SIMPLE DESCRIPTION DERM
LOCATION SIMPLE: RIGHT UPPER BACK
LOCATION SIMPLE: LEFT FOREHEAD
LOCATION SIMPLE: LEFT CHEEK
LOCATION SIMPLE: LEFT LOWER BACK
LOCATION SIMPLE: LEFT LIP
LOCATION SIMPLE: RIGHT CHEEK
LOCATION SIMPLE: ABDOMEN

## 2022-08-03 ASSESSMENT — LOCATION ZONE DERM
LOCATION ZONE: LIP
LOCATION ZONE: TRUNK
LOCATION ZONE: FACE

## 2022-08-03 ASSESSMENT — LOCATION DETAILED DESCRIPTION DERM
LOCATION DETAILED: LEFT SUPERIOR MEDIAL MIDBACK
LOCATION DETAILED: LEFT CENTRAL MALAR CHEEK
LOCATION DETAILED: LEFT RIB CAGE
LOCATION DETAILED: RIGHT INFERIOR CENTRAL MALAR CHEEK
LOCATION DETAILED: LEFT UPPER CUTANEOUS LIP
LOCATION DETAILED: RIGHT INFERIOR LATERAL MALAR CHEEK
LOCATION DETAILED: SUBXIPHOID
LOCATION DETAILED: RIGHT INFERIOR UPPER BACK
LOCATION DETAILED: LEFT MEDIAL FOREHEAD

## 2022-08-03 NOTE — PROCEDURE: MIPS QUALITY
Quality 431: Preventive Care And Screening: Unhealthy Alcohol Use - Screening: Patient not identified as an unhealthy alcohol user when screened for unhealthy alcohol use using a systematic screening method
Quality 337: Tuberculosis Prevention For Psoriasis And Psoriatic Arthritis Patients On A Biological Immune Response Modifier: No documentation of negative or managed positive TB screen
Quality 130: Documentation Of Current Medications In The Medical Record: Current Medications Documented
Quality 265: Biopsy Follow-Up: Biopsy Results not Reviewed, not Communicated to the Patient and the Patient's Primary Care/Referring Physician, Communication not Tracked in a Log, and/or Tracking Process not Documented in the Patient's Medical Record.
Quality 226: Preventive Care And Screening: Tobacco Use: Screening And Cessation Intervention: Patient screened for tobacco use and is an ex/non-smoker
Detail Level: Detailed
Quality 137: Melanoma: Continuity Of Care - Recall System: Recall system not utilized, reason not otherwise specified

## 2022-08-03 NOTE — PROCEDURE: DEFER
Procedure To Be Performed At Next Visit: Biopsy by shave method
X Size Of Lesion In Cm (Optional): 0
Size Of Lesion In Cm (Optional): 0.4
Detail Level: Detailed
Introduction Text (Please End With A Colon): The following procedure was deferred:

## 2022-08-23 ENCOUNTER — APPOINTMENT (RX ONLY)
Dept: URBAN - METROPOLITAN AREA CLINIC 325 | Facility: CLINIC | Age: 52
Setting detail: DERMATOLOGY
End: 2022-08-23

## 2022-08-23 DIAGNOSIS — D485 NEOPLASM OF UNCERTAIN BEHAVIOR OF SKIN: ICD-10-CM

## 2022-08-23 PROBLEM — D48.5 NEOPLASM OF UNCERTAIN BEHAVIOR OF SKIN: Status: ACTIVE | Noted: 2022-08-23

## 2022-08-23 PROCEDURE — ? FULL BODY SKIN EXAM - DECLINED

## 2022-08-23 PROCEDURE — 11310 SHAVE SKIN LESION 0.5 CM/<: CPT

## 2022-08-23 PROCEDURE — ? SHAVE REMOVAL

## 2022-08-23 PROCEDURE — ? COUNSELING

## 2022-08-23 ASSESSMENT — LOCATION DETAILED DESCRIPTION DERM: LOCATION DETAILED: LEFT UPPER CUTANEOUS LIP

## 2022-08-23 ASSESSMENT — LOCATION SIMPLE DESCRIPTION DERM: LOCATION SIMPLE: LEFT LIP

## 2022-08-23 ASSESSMENT — LOCATION ZONE DERM: LOCATION ZONE: LIP

## 2022-08-23 NOTE — PROCEDURE: SHAVE REMOVAL
Medical Necessity Information: It is in your best interest to select a reason for this procedure from the list below. All of these items fulfill various CMS LCD requirements except the new and changing color options.
Medical Necessity Clause: This procedure was medically necessary because the lesion that was treated was:
Lab: -60
Lab Facility: 0
Detail Level: Detailed
Was A Bandage Applied: Yes
Size Of Lesion In Cm (Required): 0.4
Biopsy Method: Dermablade
Anesthesia Type: 1% lidocaine with epinephrine
Hemostasis: Drysol
Wound Care: Petrolatum
Render Path Notes In Note?: No
Consent was obtained from the patient. The risks and benefits to therapy were discussed in detail. Specifically, the risks of infection, scarring, bleeding, prolonged wound healing, incomplete removal, allergy to anesthesia, nerve injury and recurrence were addressed. Prior to the procedure, the treatment site was clearly identified and confirmed by the patient. All components of Universal Protocol/PAUSE Rule completed.
Post-Care Instructions: I reviewed with the patient in detail post-care instructions. Patient is to keep the biopsy site dry overnight, and then apply bacitracin twice daily until healed. Patient may apply hydrogen peroxide soaks to remove any crusting.
Notification Instructions: Patient will be notified of pathology results. However, patient instructed to call the office if not contacted within 2 weeks.
Billing Type: Third-Party Bill

## 2022-09-06 ENCOUNTER — APPOINTMENT (RX ONLY)
Dept: URBAN - METROPOLITAN AREA CLINIC 325 | Facility: CLINIC | Age: 52
Setting detail: DERMATOLOGY
End: 2022-09-06

## 2022-09-06 DIAGNOSIS — L81.4 OTHER MELANIN HYPERPIGMENTATION: ICD-10-CM

## 2022-09-06 DIAGNOSIS — I99.8 OTHER DISORDER OF CIRCULATORY SYSTEM: ICD-10-CM

## 2022-09-06 PROCEDURE — ? COUNSELING

## 2022-09-06 PROCEDURE — ? SUNSCREEN RECOMMENDATIONS

## 2022-09-06 PROCEDURE — ? FULL BODY SKIN EXAM - DECLINED

## 2022-09-06 PROCEDURE — ? PATHOLOGY DISCUSSION

## 2022-09-06 PROCEDURE — 99213 OFFICE O/P EST LOW 20 MIN: CPT

## 2022-09-06 ASSESSMENT — LOCATION SIMPLE DESCRIPTION DERM
LOCATION SIMPLE: RIGHT CHEEK
LOCATION SIMPLE: LEFT CHEEK

## 2022-09-06 ASSESSMENT — LOCATION DETAILED DESCRIPTION DERM
LOCATION DETAILED: RIGHT INFERIOR CENTRAL MALAR CHEEK
LOCATION DETAILED: LEFT INFERIOR CENTRAL MALAR CHEEK

## 2022-09-06 ASSESSMENT — LOCATION ZONE DERM: LOCATION ZONE: FACE

## 2022-09-06 NOTE — PROCEDURE: MIPS QUALITY
Quality 431: Preventive Care And Screening: Unhealthy Alcohol Use - Screening: Patient not identified as an unhealthy alcohol user when screened for unhealthy alcohol use using a systematic screening method
Quality 337: Tuberculosis Prevention For Psoriasis And Psoriatic Arthritis Patients On A Biological Immune Response Modifier: No documentation of negative or managed positive TB screen
Quality 130: Documentation Of Current Medications In The Medical Record: Current Medications Documented
Quality 265: Biopsy Follow-Up: Biopsy results reviewed, communicated, tracked, and documented
Quality 226: Preventive Care And Screening: Tobacco Use: Screening And Cessation Intervention: Patient screened for tobacco use and is an ex/non-smoker
Detail Level: Detailed
Quality 137: Melanoma: Continuity Of Care - Recall System: Recall system not utilized, reason not otherwise specified

## (undated) NOTE — MR AVS SNAPSHOT
[facility]  [address] [facility], [address]  [address] [facility] [address] [phone]               Thank you for choosing us for your health care visit with Magalene Babinski, MD.  We are glad to serve you and happy to provide you with [facility] Current Medications          This list is accurate as of: 5/15/17  3:07 PM.  Always use your most recent med list.                Ibuprofen 200 MG Caps   Take by mouth. MULTI-VITAMIN Tabs   Take 1 tablet by mouth daily.            triamcinolone ac

## (undated) NOTE — MR AVS SNAPSHOT
[address]  [address] [facility] [address] [phone]               Thank you for choosing us for your health care visit with KEVIN Campa.   We are glad to serve you and happy to provide you with this summary o You may have:  · Facial soreness pain  · Headache  · Fever  · Fluid draining in the back of the throat (postnasal drip)  · Congestion  · Drainage that is thick and colored, instead of clear  · Cough  Diagnosing acute sinusitis  Your doctor will ask about y Take 1 tablet (20 mg total) by mouth daily. Commonly known as:  DELTASONE           triamcinolone acetonide 0.1 % Crea   Apply topically 2 (two) times daily as needed.    Commonly known as:  KENALOG                Where to Get Your Medications      These